# Patient Record
Sex: MALE | Race: BLACK OR AFRICAN AMERICAN | NOT HISPANIC OR LATINO
[De-identification: names, ages, dates, MRNs, and addresses within clinical notes are randomized per-mention and may not be internally consistent; named-entity substitution may affect disease eponyms.]

---

## 2021-07-12 ENCOUNTER — TRANSCRIBE ORDERS (OUTPATIENT)
Dept: OTHER | Age: 26
End: 2021-07-12

## 2021-07-12 DIAGNOSIS — Z31.69 INFERTILITY COUNSELING: Primary | ICD-10-CM

## 2021-07-14 ENCOUNTER — PRE VISIT (OUTPATIENT)
Dept: UROLOGY | Facility: CLINIC | Age: 26
End: 2021-07-14

## 2021-07-14 ENCOUNTER — TELEPHONE (OUTPATIENT)
Dept: UROLOGY | Facility: CLINIC | Age: 26
End: 2021-07-14

## 2021-07-14 DIAGNOSIS — E29.1 TESTICULAR HYPOFUNCTION: Primary | ICD-10-CM

## 2021-07-14 NOTE — TELEPHONE ENCOUNTER
[facility] Call [facility]    Phone Message    May a detailed message be left on voicemail: yes     Reason for Call: Order(s): Other:   Reason for requested: SA  Date needed: before 10/21/2021  Provider name:     Please place SA order and call pt once complete, thank you      Action Taken: Message routed to:  [facility] ([facility]): uro    Travel Screening: Not Applicable

## 2021-10-05 ENCOUNTER — PRE VISIT (OUTPATIENT)
Dept: UROLOGY | Facility: CLINIC | Age: 26
End: 2021-10-05

## 2021-10-05 NOTE — TELEPHONE ENCOUNTER
Reason for visit: Consult     Relevant information: infertility    Records/imaging/labs/orders: in Epic; SA completed    Pt called: no    At Rooming: normal

## 2021-10-21 ENCOUNTER — VIRTUAL VISIT (OUTPATIENT)
Dept: UROLOGY | Facility: CLINIC | Age: 26
End: 2021-10-21
Attending: UROLOGY
Payer: COMMERCIAL

## 2021-10-21 VITALS — HEIGHT: 68 IN | BODY MASS INDEX: 42.44 KG/M2 | WEIGHT: 280 LBS

## 2021-10-21 DIAGNOSIS — R86.8 ASTHENOSPERMIA: Primary | ICD-10-CM

## 2021-10-21 DIAGNOSIS — E29.1 TESTICULAR HYPOFUNCTION: ICD-10-CM

## 2021-10-21 PROCEDURE — 99203 OFFICE O/P NEW LOW 30 MIN: CPT | Mod: 95 | Performed by: UROLOGY

## 2021-10-21 RX ORDER — ALBUTEROL SULFATE 90 UG/1
1-2 AEROSOL, METERED RESPIRATORY (INHALATION)
COMMUNITY
Start: 2021-01-06

## 2021-10-21 ASSESSMENT — MIFFLIN-ST. JEOR: SCORE: 2224.57

## 2021-10-21 ASSESSMENT — PAIN SCALES - GENERAL: PAINLEVEL: NO PAIN (0)

## 2021-10-21 NOTE — PATIENT INSTRUCTIONS
Please get lab work and then follow up in clinic for physical exam and to review test results.    It was a pleasure meeting with you today.  Thank you for allowing me and my team the privilege of caring for you today.  YOU are the reason we are here, and I truly hope we provided you with the excellent service you deserve.  Please let us know if there is anything else we can do for you so that we can be sure you are leaving completely satisfied with your care experience.

## 2021-10-21 NOTE — NURSING NOTE
"Chief Complaint   Patient presents with     Consult     Infertility       Height 1.727 m (5' 8\"), weight 127 kg (280 lb). Body mass index is 42.57 kg/m .    There is no problem list on file for this patient.      No Known Allergies    Current Outpatient Medications   Medication Sig Dispense Refill     albuterol (PROAIR HFA/PROVENTIL HFA/VENTOLIN HFA) 108 (90 Base) MCG/ACT inhaler Inhale 1-2 puffs into the lungs         Social History     Tobacco Use     Smoking status: Former Smoker     Smokeless tobacco: Former User   Substance Use Topics     Alcohol use: None     Drug use: None       Baljeet Roberts EMT  10/21/2021  10:16 AM  "

## 2021-10-21 NOTE — PROGRESS NOTES
Ad is a 26 year old who is being evaluated via a billable video visit.      Video Visit Technology for this patient: Oscar Video Visit- Sent invite to patient's email    How would you like to obtain your AVS? Mail a copy  If the video visit is dropped, the invitation should be resent by: Send to e-mail at: [email or web address]  Will anyone else be joining your video visit? No      Video Start Time: 10:33 AM  Video-Visit Details    Type of service:  Video Visit    Video End Time:10:46 AM    Originating Location (pt. Location): Home    Distant Location (provider location):  [facility] [place]     Platform used for Video Visit: Oscar      Dear Dr. Socrates Ingram, it was my pleasure to see . Ad Nixon, a 26 year old male here in consultation today for fertility evaluation.  His spouse is Megan Nixon age 26 (8/21/95). ( pt gave verbal consent to looking [facility])      This couple has been attempting to conceive for the last year or more. They have no previous pregnancy together.  Pregnancies with other partners: she has a son with another partner, age 8.  They have tried timed intercourse. They have not tried IUI or IVF.    Female factors suspected: PCOS.  Treated with medications.  Seems to be regular on medications.    PAST MEDICAL HISTORY:      Asthma     Pseudofolliculitis barbae     Dissecting cellulitis of scalp     Alopecia     Preventative health care     Routine screening for STI (sexually transmitted infection)     Infertility counseling     Tobacco dependence      PAST SURG HISTORY  No history of surgery.       Medications as of 10/21/2021:  Current Outpatient Medications   Medication Sig     albuterol (PROAIR HFA/PROVENTIL HFA/VENTOLIN HFA) 108 (90 Base) MCG/ACT inhaler Inhale 1-2 puffs into the lungs     No current facility-administered medications for this visit.        ALLERGY:   No Known Allergies    SOCIAL HISTORY:   . Occupation: airport.   No alcohol abuse, no tobacco use (quit a little over a year ago)  Social History     Tobacco Use     Smoking status: Former Smoker     Smokeless tobacco: Former User   Substance Use Topics     Alcohol use: None     Drug use: None     REVIEW OF SYSTEMS:  Significant for feeling well at present.  Just feels a little overweight.    Denies erectile dysfunction, ejaculatory problems, testicular pain. No vision or smell deficits, no chronic sinus or respiratory infections. No recent febrile illness, weight loss. No heat or cold intolerance, gynecomastia, or other endocrine complaints.    Otherwise, no constitutional, eye, ENT, heart, lung, GI , musculoskeletal, skin, neurologic, psychiatric, or hematologic complaints.    GONADOTOXIN EXPOSURE: Unremarkable. Otherwise negative for marijuana, heat, chemicals, pesticides, heavy metals, steroids, chemotherapy or radiation.    GENERAL PHYSICAL EXAM  General- Alert, oriented, nad.  Pleasant and conversant.  Eyes- anicteric, EOMI.  Resps- normal, non-labored.  No cough  Abdomen-  nondistended.   exam- deferred.   Neurological - no tremors  Skin - no discoloration/ lesions noted  Psychiatric - no anxiety, alert & oriented.      The rest of a comprehensive physical examination is deferred due to video visit restrictions.      Labs/imaging reviewed by me today:  Semen Analysis 7/8/2021: 3 ml,  pH 8, 32.5 M/cc, 12% progressive, 3% morphology (> 4%), Total Progressive Motile Count: 11.7 Million.  Viability 64%    Semen Analysis 6/14/2021: 0.5 ml,  pH 8.5, 7.9 M/cc, 14% progressive, 3% morphology (> 4%), Total Progressive Motile Count: 0.55 Million.       Semen Analysis 5/24/2018: 1 ml,  pH 8.5, 34.3 M/cc, 44% progressive, 11% morphology (> 4%), Total Progressive Motile Count: 15.1 Million.       Hemoglobin A1c 4.9 January 3, 2020  TSH 0.99 January 3, 2020      ASSESSMENT:    Fertility Testing.    Testicular hypofunction: oligospermia, asthenospermia,  teratospermia    PLAN:    Hormonal panel    Discussed OTC supplements to consider taking    RTC for physical exam and blood labs      Thank-you for allowing me to care for your patient.  Sincerely,    Luis Price MD      CC: Nataly.          Additional Coding Information:    Problems:  3 -- one stable chronic illness    Data Reviewed  Review of the result(s) of each unique test - Semen analysis x3, and 2 blood tests as listed above    Tests ordered/pendin blood tests    Notes from other providers reviewed: Reviewed 2 urology notes from Dr. Ingram at [facility] on 2018, and 3/17/2021    Independent interpretation of a test performed by another physician/other qualified health care professional (not separately reported) -I reviewed and interpreted results of his 3 semen analyses.  These have shown low sperm count, low sperm motility, and teratospermia in the past, in varying combinations.  I discussed these implications with the patient, and how they may affect fertility.    Level of risk:  3 -- low risk (e.g., OTC medication or observation, minor surgery without risks)    Time spent:  24 minutes spent on the date of the encounter doing chart review, history and exam, documentation and further activities per the note.  This includes the video call time above.

## 2021-10-21 NOTE — LETTER
10/21/2021       RE: Ad Nixon  [address]  [place] [facility] [address]     Dear Colleague,    Thank you for referring your patient, Ad Nixon, to the [facility] [place] [facility]. Please see a copy of my visit note below.    dA is a 26 year old who is being evaluated via a billable video visit.      Video Visit Technology for this patient: Oscar Video Visit- Sent invite to patient's email    How would you like to obtain your AVS? Mail a copy  If the video visit is dropped, the invitation should be resent by: Send to e-mail at: [email or web address]  Will anyone else be joining your video visit? No      Video Start Time: 10:33 AM  Video-Visit Details    Type of service:  Video Visit    Video End Time:10:46 AM    Originating Location (pt. Location): Home    Distant Location (provider location):  [facility] UROLOG[facility]     Platform used for Video Visit: Kyaw      Dear Dr. Socrates Ingram, it was my pleasure to see . Ad Nixon, a 26 year old male here in consultation today for fertility evaluation.  His spouse is Megan Nixon age 26 (8/21/95). ( pt gave verbal consent to looking at [facility])      This couple has been attempting to conceive for the last year or more. They have no previous pregnancy together.  Pregnancies with other partners: she has a son with another partner, age 8.  They have tried timed intercourse. They have not tried IUI or IVF.    Female factors suspected: PCOS.  Treated with medications.  Seems to be regular on medications.    PAST MEDICAL HISTORY:      Asthma     Pseudofolliculitis barbae     Dissecting cellulitis of scalp     Alopecia     Preventative health care     Routine screening for STI (sexually transmitted infection)     Infertility counseling     Tobacco dependence      PAST SURG HISTORY  No history of surgery.       Medications as of  10/21/2021:  Current Outpatient Medications   Medication Sig     albuterol (PROAIR HFA/PROVENTIL HFA/VENTOLIN HFA) 108 (90 Base) MCG/ACT inhaler Inhale 1-2 puffs into the lungs     No current facility-administered medications for this visit.        ALLERGY:   No Known Allergies    SOCIAL HISTORY:   . Occupation: airport.  No alcohol abuse, no tobacco use (quit a little over a year ago)  Social History     Tobacco Use     Smoking status: Former Smoker     Smokeless tobacco: Former User   Substance Use Topics     Alcohol use: None     Drug use: None     REVIEW OF SYSTEMS:  Significant for feeling well at present.  Just feels a little overweight.    Denies erectile dysfunction, ejaculatory problems, testicular pain. No vision or smell deficits, no chronic sinus or respiratory infections. No recent febrile illness, weight loss. No heat or cold intolerance, gynecomastia, or other endocrine complaints.    Otherwise, no constitutional, eye, ENT, heart, lung, GI , musculoskeletal, skin, neurologic, psychiatric, or hematologic complaints.    GONADOTOXIN EXPOSURE: Unremarkable. Otherwise negative for marijuana, heat, chemicals, pesticides, heavy metals, steroids, chemotherapy or radiation.    GENERAL PHYSICAL EXAM  General- Alert, oriented, nad.  Pleasant and conversant.  Eyes- anicteric, EOMI.  Resps- normal, non-labored.  No cough  Abdomen-  nondistended.   exam- deferred.   Neurological - no tremors  Skin - no discoloration/ lesions noted  Psychiatric - no anxiety, alert & oriented.      The rest of a comprehensive physical examination is deferred due to video visit restrictions.      Labs/imaging reviewed by me today:  Semen Analysis 7/8/2021: 3 ml,  pH 8, 32.5 M/cc, 12% progressive, 3% morphology (> 4%), Total Progressive Motile Count: 11.7 Million.  Viability 64%    Semen Analysis 6/14/2021: 0.5 ml,  pH 8.5, 7.9 M/cc, 14% progressive, 3% morphology (> 4%), Total Progressive Motile Count: 0.55 Million.        Semen Analysis 2018: 1 ml,  pH 8.5, 34.3 M/cc, 44% progressive, 11% morphology (> 4%), Total Progressive Motile Count: 15.1 Million.       Hemoglobin A1c 4.9 January 3, 2020  TSH 0.99 January 3, 2020      ASSESSMENT:    Fertility Testing.    Testicular hypofunction: oligospermia, asthenospermia, teratospermia    PLAN:    Hormonal panel    Discussed OTC supplements to consider taking    RTC for physical exam and blood labs      Thank-you for allowing me to care for your patient.  Sincerely,    Luis Price MD      CC: Nataly.          Additional Coding Information:    Problems:  3 -- one stable chronic illness    Data Reviewed  Review of the result(s) of each unique test - Semen analysis x3, and 2 blood tests as listed above    Tests ordered/pendin blood tests    Notes from other providers reviewed: Reviewed 2 urology notes from Dr. Ingram at [facility] on 2018, and 3/17/2021    Independent interpretation of a test performed by another physician/other qualified health care professional (not separately reported) -I reviewed and interpreted results of his 3 semen analyses.  These have shown low sperm count, low sperm motility, and teratospermia in the past, in varying combinations.  I discussed these implications with the patient, and how they may affect fertility.    Level of risk:  3 -- low risk (e.g., OTC medication or observation, minor surgery without risks)    Time spent:  24 minutes spent on the date of the encounter doing chart review, history and exam, documentation and further activities per the note.  This includes the video call time above.      Luis Price MD       complains of pain/discomfort

## 2021-10-22 ENCOUNTER — LAB (OUTPATIENT)
Dept: LAB | Facility: CLINIC | Age: 26
End: 2021-10-22
Payer: COMMERCIAL

## 2021-10-22 DIAGNOSIS — E29.1 TESTICULAR HYPOFUNCTION: ICD-10-CM

## 2021-10-22 DIAGNOSIS — R86.8 ASTHENOSPERMIA: ICD-10-CM

## 2021-10-22 LAB
FSH SERPL-ACNC: 2.4 IU/L (ref 0.7–10.8)
LH SERPL-ACNC: 3.2 IU/L (ref 1.5–9.3)
PROLACTIN SERPL-MCNC: 11 UG/L (ref 2–18)
SHBG SERPL-SCNC: 14 NMOL/L (ref 11–80)

## 2021-10-22 PROCEDURE — 83002 ASSAY OF GONADOTROPIN (LH): CPT | Mod: 90 | Performed by: PATHOLOGY

## 2021-10-22 PROCEDURE — 84403 ASSAY OF TOTAL TESTOSTERONE: CPT | Mod: 90 | Performed by: PATHOLOGY

## 2021-10-22 PROCEDURE — 99000 SPECIMEN HANDLING OFFICE-LAB: CPT | Performed by: PATHOLOGY

## 2021-10-22 PROCEDURE — 84270 ASSAY OF SEX HORMONE GLOBUL: CPT | Mod: 90 | Performed by: PATHOLOGY

## 2021-10-22 PROCEDURE — 36415 COLL VENOUS BLD VENIPUNCTURE: CPT | Performed by: PATHOLOGY

## 2021-10-22 PROCEDURE — 83001 ASSAY OF GONADOTROPIN (FSH): CPT | Mod: 90 | Performed by: PATHOLOGY

## 2021-10-22 PROCEDURE — 84146 ASSAY OF PROLACTIN: CPT | Mod: 90 | Performed by: PATHOLOGY

## 2021-11-04 LAB
SHBG SERPL-SCNC: 14 NMOL/L (ref 11–80)
SHBG SERPL-SCNC: 14 NMOL/L (ref 11–80)
TESTOST FREE SERPL-MCNC: 3.05 NG/DL
TESTOST SERPL-MCNC: 110 NG/DL (ref 240–950)

## 2021-11-09 NOTE — RESULT ENCOUNTER NOTE
Nii,  You can go to any [facility] to have these labs done. If you decide to come to the [facility] let me know so I can assist with scheduling the lab appointment. Nii,  You can go to any [facility] to have these labs done. If you decide to come to the [facility] let me know so I can assist with scheduling the lab appointment. You will need to call [phone] to schedule an appointment with Dr Price.     Milagros Rocha RN   Care Coordinator Urology    Nii,  You can go to any [facility] to have these labs done. If you decide to come to the [facility] let me know so I can assist with scheduling the lab appointment. You will need to call [phone] to schedule an appointment with Dr Price.     Milagros Rocha RN   Care Coordinator Urology

## 2021-11-24 ENCOUNTER — PRE VISIT (OUTPATIENT)
Dept: UROLOGY | Facility: CLINIC | Age: 26
End: 2021-11-24
Payer: COMMERCIAL

## 2021-11-24 NOTE — TELEPHONE ENCOUNTER
Reason for visit: Follow up     Relevant information: physical exam    Records/imaging/labs/orders: in EPIC    Pt called: no    At Rooming: normal

## 2021-12-12 ENCOUNTER — HEALTH MAINTENANCE LETTER (OUTPATIENT)
Age: 26
End: 2021-12-12

## 2022-01-04 ENCOUNTER — PRE VISIT (OUTPATIENT)
Dept: UROLOGY | Facility: CLINIC | Age: 27
End: 2022-01-04
Payer: COMMERCIAL

## 2022-01-04 NOTE — TELEPHONE ENCOUNTER
Reason for visit: Follow up     Relevant information: physical exam and blood labs    Records/imaging/labs/orders: in EPIC    Pt called: no    At Rooming: normal

## 2022-01-25 ENCOUNTER — PRE VISIT (OUTPATIENT)
Dept: UROLOGY | Facility: CLINIC | Age: 27
End: 2022-01-25
Payer: COMMERCIAL

## 2022-10-03 ENCOUNTER — HEALTH MAINTENANCE LETTER (OUTPATIENT)
Age: 27
End: 2022-10-03

## 2023-02-11 ENCOUNTER — HEALTH MAINTENANCE LETTER (OUTPATIENT)
Age: 28
End: 2023-02-11

## 2024-03-09 ENCOUNTER — HEALTH MAINTENANCE LETTER (OUTPATIENT)
Age: 29
End: 2024-03-09